# Patient Record
Sex: FEMALE | URBAN - METROPOLITAN AREA
[De-identification: names, ages, dates, MRNs, and addresses within clinical notes are randomized per-mention and may not be internally consistent; named-entity substitution may affect disease eponyms.]

---

## 2023-10-31 ENCOUNTER — HOSPITAL ENCOUNTER (INPATIENT)
Age: 23
LOS: 2 days | Discharge: HOME | End: 2023-11-02
Attending: OBSTETRICS & GYNECOLOGY | Admitting: OBSTETRICS & GYNECOLOGY

## 2023-10-31 PROBLEM — Z37.9 NORMAL LABOR: Status: ACTIVE | Noted: 2023-10-31

## 2023-10-31 LAB
ABO + RH BLD: NORMAL
AMNISURE, POC: POSITIVE
AMPHET UR QL SCN: NEGATIVE
BARBITURATES UR QL SCN: NEGATIVE
BENZODIAZ UR QL: NEGATIVE
BLOOD GROUP ANTIBODIES SERPL: NORMAL
CANNABINOIDS UR QL SCN: NEGATIVE
COCAINE UR QL SCN: NEGATIVE
ERYTHROCYTE [DISTWIDTH] IN BLOOD BY AUTOMATED COUNT: 15.9 % (ref 11.9–14.6)
HCT VFR BLD AUTO: 28.9 % (ref 35.8–46.3)
HGB BLD-MCNC: 8.9 G/DL (ref 11.7–15.4)
Lab: NORMAL
MCH RBC QN AUTO: 25 PG (ref 26.1–32.9)
MCHC RBC AUTO-ENTMCNC: 30.8 G/DL (ref 31.4–35)
MCV RBC AUTO: 81.2 FL (ref 82–102)
METHADONE UR QL: NEGATIVE
NEGATIVE QC PASS/FAIL: NORMAL
NRBC # BLD: 0 K/UL (ref 0–0.2)
OPIATES UR QL: NEGATIVE
PCP UR QL: NEGATIVE
PLATELET # BLD AUTO: 254 K/UL (ref 150–450)
PMV BLD AUTO: 9.6 FL (ref 9.4–12.3)
POSITIVE QC PASS/FAIL: NORMAL
RBC # BLD AUTO: 3.56 M/UL (ref 4.05–5.2)
RPR SER QL: NONREACTIVE
RUBV IGG SERPL IA-ACNC: 47.8 IU/ML
SPECIMEN EXP DATE BLD: NORMAL
WBC # BLD AUTO: 12 K/UL (ref 4.3–11.1)

## 2023-10-31 PROCEDURE — 6370000000 HC RX 637 (ALT 250 FOR IP): Performed by: OBSTETRICS & GYNECOLOGY

## 2023-10-31 PROCEDURE — 7100000010 HC PHASE II RECOVERY - FIRST 15 MIN

## 2023-10-31 PROCEDURE — 86780 TREPONEMA PALLIDUM: CPT

## 2023-10-31 PROCEDURE — 2580000003 HC RX 258: Performed by: OBSTETRICS & GYNECOLOGY

## 2023-10-31 PROCEDURE — 36415 COLL VENOUS BLD VENIPUNCTURE: CPT

## 2023-10-31 PROCEDURE — 4A1HXCZ MONITORING OF PRODUCTS OF CONCEPTION, CARDIAC RATE, EXTERNAL APPROACH: ICD-10-PCS | Performed by: OBSTETRICS & GYNECOLOGY

## 2023-10-31 PROCEDURE — 7220000101 HC DELIVERY VAGINAL/SINGLE

## 2023-10-31 PROCEDURE — 87150 DNA/RNA AMPLIFIED PROBE: CPT

## 2023-10-31 PROCEDURE — 1100000000 HC RM PRIVATE

## 2023-10-31 PROCEDURE — 80307 DRUG TEST PRSMV CHEM ANLYZR: CPT

## 2023-10-31 PROCEDURE — 86900 BLOOD TYPING SEROLOGIC ABO: CPT

## 2023-10-31 PROCEDURE — 86803 HEPATITIS C AB TEST: CPT

## 2023-10-31 PROCEDURE — 87340 HEPATITIS B SURFACE AG IA: CPT

## 2023-10-31 PROCEDURE — 6360000002 HC RX W HCPCS: Performed by: OBSTETRICS & GYNECOLOGY

## 2023-10-31 PROCEDURE — 86901 BLOOD TYPING SEROLOGIC RH(D): CPT

## 2023-10-31 PROCEDURE — 86762 RUBELLA ANTIBODY: CPT

## 2023-10-31 PROCEDURE — 7100000011 HC PHASE II RECOVERY - ADDTL 15 MIN

## 2023-10-31 PROCEDURE — 59025 FETAL NON-STRESS TEST: CPT

## 2023-10-31 PROCEDURE — 99285 EMERGENCY DEPT VISIT HI MDM: CPT

## 2023-10-31 PROCEDURE — 85027 COMPLETE CBC AUTOMATED: CPT

## 2023-10-31 PROCEDURE — 86850 RBC ANTIBODY SCREEN: CPT

## 2023-10-31 PROCEDURE — 87389 HIV-1 AG W/HIV-1&-2 AB AG IA: CPT

## 2023-10-31 PROCEDURE — 7210000100 HC LABOR FEE PER 1 HR

## 2023-10-31 PROCEDURE — 86592 SYPHILIS TEST NON-TREP QUAL: CPT

## 2023-10-31 PROCEDURE — 87081 CULTURE SCREEN ONLY: CPT

## 2023-10-31 RX ORDER — SODIUM CHLORIDE 0.9 % (FLUSH) 0.9 %
5-40 SYRINGE (ML) INJECTION PRN
Status: DISCONTINUED | OUTPATIENT
Start: 2023-10-31 | End: 2023-11-02 | Stop reason: HOSPADM

## 2023-10-31 RX ORDER — SODIUM CHLORIDE, SODIUM LACTATE, POTASSIUM CHLORIDE, AND CALCIUM CHLORIDE .6; .31; .03; .02 G/100ML; G/100ML; G/100ML; G/100ML
500 INJECTION, SOLUTION INTRAVENOUS PRN
Status: DISCONTINUED | OUTPATIENT
Start: 2023-10-31 | End: 2023-11-01

## 2023-10-31 RX ORDER — SODIUM CHLORIDE 0.9 % (FLUSH) 0.9 %
5-40 SYRINGE (ML) INJECTION PRN
Status: DISCONTINUED | OUTPATIENT
Start: 2023-10-31 | End: 2023-11-01

## 2023-10-31 RX ORDER — LIDOCAINE HYDROCHLORIDE 20 MG/ML
JELLY TOPICAL
Status: DISPENSED
Start: 2023-10-31 | End: 2023-11-01

## 2023-10-31 RX ORDER — ONDANSETRON 2 MG/ML
4 INJECTION INTRAMUSCULAR; INTRAVENOUS EVERY 6 HOURS PRN
Status: DISCONTINUED | OUTPATIENT
Start: 2023-10-31 | End: 2023-11-01

## 2023-10-31 RX ORDER — LIDOCAINE HYDROCHLORIDE 10 MG/ML
INJECTION, SOLUTION INFILTRATION; PERINEURAL
Status: DISPENSED
Start: 2023-10-31 | End: 2023-11-01

## 2023-10-31 RX ORDER — SODIUM CHLORIDE, SODIUM LACTATE, POTASSIUM CHLORIDE, CALCIUM CHLORIDE 600; 310; 30; 20 MG/100ML; MG/100ML; MG/100ML; MG/100ML
INJECTION, SOLUTION INTRAVENOUS CONTINUOUS
Status: DISCONTINUED | OUTPATIENT
Start: 2023-10-31 | End: 2023-11-01

## 2023-10-31 RX ORDER — DOCUSATE SODIUM 100 MG/1
100 CAPSULE, LIQUID FILLED ORAL 2 TIMES DAILY PRN
Status: DISCONTINUED | OUTPATIENT
Start: 2023-10-31 | End: 2023-11-02 | Stop reason: HOSPADM

## 2023-10-31 RX ORDER — SODIUM CHLORIDE 9 MG/ML
INJECTION, SOLUTION INTRAVENOUS PRN
Status: DISCONTINUED | OUTPATIENT
Start: 2023-10-31 | End: 2023-11-02 | Stop reason: HOSPADM

## 2023-10-31 RX ORDER — ACETAMINOPHEN 325 MG/1
650 TABLET ORAL EVERY 4 HOURS PRN
Status: DISCONTINUED | OUTPATIENT
Start: 2023-10-31 | End: 2023-10-31

## 2023-10-31 RX ORDER — IBUPROFEN 800 MG/1
800 TABLET ORAL EVERY 8 HOURS PRN
Status: DISCONTINUED | OUTPATIENT
Start: 2023-10-31 | End: 2023-11-02 | Stop reason: HOSPADM

## 2023-10-31 RX ORDER — SODIUM CHLORIDE, SODIUM LACTATE, POTASSIUM CHLORIDE, AND CALCIUM CHLORIDE .6; .31; .03; .02 G/100ML; G/100ML; G/100ML; G/100ML
1000 INJECTION, SOLUTION INTRAVENOUS PRN
Status: DISCONTINUED | OUTPATIENT
Start: 2023-10-31 | End: 2023-11-01

## 2023-10-31 RX ORDER — LANOLIN
CREAM (ML) TOPICAL PRN
Status: DISCONTINUED | OUTPATIENT
Start: 2023-10-31 | End: 2023-11-02 | Stop reason: HOSPADM

## 2023-10-31 RX ORDER — SODIUM CHLORIDE 9 MG/ML
INJECTION, SOLUTION INTRAVENOUS PRN
Status: DISCONTINUED | OUTPATIENT
Start: 2023-10-31 | End: 2023-11-01

## 2023-10-31 RX ORDER — TRANEXAMIC ACID 10 MG/ML
1000 INJECTION, SOLUTION INTRAVENOUS
Status: DISCONTINUED | OUTPATIENT
Start: 2023-10-31 | End: 2023-11-01

## 2023-10-31 RX ORDER — SODIUM CHLORIDE 0.9 % (FLUSH) 0.9 %
5-40 SYRINGE (ML) INJECTION EVERY 12 HOURS SCHEDULED
Status: DISCONTINUED | OUTPATIENT
Start: 2023-10-31 | End: 2023-11-02 | Stop reason: HOSPADM

## 2023-10-31 RX ORDER — METHYLERGONOVINE MALEATE 0.2 MG/ML
200 INJECTION INTRAVENOUS ONCE
Status: COMPLETED | OUTPATIENT
Start: 2023-10-31 | End: 2023-10-31

## 2023-10-31 RX ORDER — SODIUM CHLORIDE 0.9 % (FLUSH) 0.9 %
5-40 SYRINGE (ML) INJECTION EVERY 12 HOURS SCHEDULED
Status: DISCONTINUED | OUTPATIENT
Start: 2023-10-31 | End: 2023-11-01

## 2023-10-31 RX ORDER — ACETAMINOPHEN 500 MG
1000 TABLET ORAL EVERY 8 HOURS PRN
Status: DISCONTINUED | OUTPATIENT
Start: 2023-10-31 | End: 2023-11-02 | Stop reason: HOSPADM

## 2023-10-31 RX ADMIN — ACETAMINOPHEN 1000 MG: 500 TABLET, FILM COATED ORAL at 23:47

## 2023-10-31 RX ADMIN — IBUPROFEN 800 MG: 800 TABLET, FILM COATED ORAL at 13:16

## 2023-10-31 RX ADMIN — IBUPROFEN 800 MG: 800 TABLET, FILM COATED ORAL at 21:38

## 2023-10-31 RX ADMIN — OXYTOCIN 87.3 MILLI-UNITS/MIN: 10 INJECTION, SOLUTION INTRAMUSCULAR; INTRAVENOUS at 12:43

## 2023-10-31 RX ADMIN — ACETAMINOPHEN 1000 MG: 500 TABLET, FILM COATED ORAL at 14:55

## 2023-10-31 RX ADMIN — DOCUSATE SODIUM 100 MG: 100 CAPSULE, LIQUID FILLED ORAL at 21:39

## 2023-10-31 RX ADMIN — METHYLERGONOVINE MALEATE 200 MCG: 0.2 INJECTION, SOLUTION INTRAMUSCULAR; INTRAVENOUS at 13:28

## 2023-10-31 RX ADMIN — SODIUM CHLORIDE 5 MILLION UNITS: 900 INJECTION INTRAVENOUS at 11:32

## 2023-10-31 RX ADMIN — SODIUM CHLORIDE, POTASSIUM CHLORIDE, SODIUM LACTATE AND CALCIUM CHLORIDE: 600; 310; 30; 20 INJECTION, SOLUTION INTRAVENOUS at 10:43

## 2023-10-31 ASSESSMENT — PAIN DESCRIPTION - ORIENTATION
ORIENTATION: LOWER
ORIENTATION: LOWER

## 2023-10-31 ASSESSMENT — PAIN SCALES - GENERAL
PAINLEVEL_OUTOF10: 3
PAINLEVEL_OUTOF10: 3
PAINLEVEL_OUTOF10: 5

## 2023-10-31 ASSESSMENT — PAIN DESCRIPTION - DESCRIPTORS
DESCRIPTORS: CRAMPING
DESCRIPTORS: CRAMPING

## 2023-10-31 ASSESSMENT — PAIN DESCRIPTION - LOCATION
LOCATION: ABDOMEN
LOCATION: ABDOMEN

## 2023-10-31 NOTE — L&D DELIVERY NOTE
Patient name: Michelle Murray  Date of service: 10/31/23  MRN: [id number]  CSN: [id number]  : 2000  Delivering doctor: Juan Brennan MD  Anesthesia type: None  Procedure: Spontaneous vaginal delivery  Estimated blood loss: less than 500 mL  Operative Findings: female infant, Apgars were 7 and 9 at one and five minutes respectively. Weight was 8 lbs 14 ozs  Specimens sent to pathology/lab: cord blood and cord segment  Complications: Shoulder dystocia lasting less than 30 seconds. Relieved by Chelsey maneuver, suprapubic pressure, and delivery of posterior arm  Postprocedure patient condition: Good    She was noted to be complete and began to push. She went on to deliver a live female infant over an intact (no episiotomy) perineum. Shoulder dystocia was encountered as detailed above. No nuchal cord was noted. Pitocin was bolused in the IV. The cord was doubly clamped and cut. The infant was attended to by nursery staff. Cord blood specimens were obtained. The placenta spontaneously delivered and was found to be intact with a three-vessel cord. Massage of the abdomen revealed the fundus to be firm. Inspection of the perineum revealed no lacerations There was excellent hemostasis and good uterine tone. Mother and infant remained in the room postpartum in stable condition. Sharps were disposed of appropriately.

## 2023-10-31 NOTE — PROGRESS NOTES
Patient up to bathroom with assistance. Ewa-care taught and completed. Questions encouraged and answered. Patient ambulating without difficulty, encouraged to call for needs or concerns. Verbalizes understanding.

## 2023-10-31 NOTE — CARE COORDINATION
SW consult received -  will meet with patient after delivery.     JOHN Rivas-DOUG, [facility]   [phone]

## 2023-10-31 NOTE — PROGRESS NOTES
Pt set up for delivery; Dr. Que Lindsay, med student, myself, scrub tech, and charge RN at bedside; pt pushing.

## 2023-10-31 NOTE — PROGRESS NOTES
SBAR report received from Adam Barrios RN; care assumed. Pt admitted to room 433 for labor; IV started; admission completed with help from .

## 2023-10-31 NOTE — PROGRESS NOTES
10/31/23 1011   Cervical Exam   Dilation (cm) 8  (7-8)   Effacement 90   Station -2   Station (Labor Curve Graph) 7   OB Examiner MD NICK   Membrane/Amniotic Fluid   Membrane Status Spontaneous   Rupture Date 10/31/23   Rupture Time 0730   Amniotic Fluid Color Clear   Amniotic Fluid Odor None   Amniotic Fluid Amount Moderate

## 2023-11-01 LAB
GP B STREP DNA SPEC QL NAA+PROBE: NOT DETECTED
HBV SURFACE AG SER QL: NONREACTIVE
HCV AB SER QL: NONREACTIVE
HGB BLD-MCNC: 7.5 G/DL (ref 11.7–15.4)
HIV 1+2 AB+HIV1 P24 AG SERPL QL IA: NONREACTIVE
HIV 1/2 RESULT COMMENT: NORMAL
T PALLIDUM AB SER QL IA: NON REACTIVE

## 2023-11-01 PROCEDURE — 36415 COLL VENOUS BLD VENIPUNCTURE: CPT

## 2023-11-01 PROCEDURE — 1100000000 HC RM PRIVATE

## 2023-11-01 PROCEDURE — 87591 N.GONORRHOEAE DNA AMP PROB: CPT

## 2023-11-01 PROCEDURE — 85018 HEMOGLOBIN: CPT

## 2023-11-01 PROCEDURE — 87491 CHLMYD TRACH DNA AMP PROBE: CPT

## 2023-11-01 PROCEDURE — 6370000000 HC RX 637 (ALT 250 FOR IP): Performed by: OBSTETRICS & GYNECOLOGY

## 2023-11-01 RX ORDER — OXYCODONE HYDROCHLORIDE 5 MG/1
5 TABLET ORAL EVERY 6 HOURS PRN
Status: DISCONTINUED | OUTPATIENT
Start: 2023-11-01 | End: 2023-11-02 | Stop reason: HOSPADM

## 2023-11-01 RX ADMIN — IBUPROFEN 800 MG: 800 TABLET, FILM COATED ORAL at 18:46

## 2023-11-01 RX ADMIN — ACETAMINOPHEN 1000 MG: 500 TABLET, FILM COATED ORAL at 21:45

## 2023-11-01 RX ADMIN — IBUPROFEN 800 MG: 800 TABLET, FILM COATED ORAL at 04:23

## 2023-11-01 RX ADMIN — OXYCODONE HYDROCHLORIDE 5 MG: 5 TABLET ORAL at 08:36

## 2023-11-01 RX ADMIN — ACETAMINOPHEN 1000 MG: 500 TABLET, FILM COATED ORAL at 12:18

## 2023-11-01 ASSESSMENT — PAIN DESCRIPTION - DESCRIPTORS
DESCRIPTORS: CRAMPING
DESCRIPTORS: CRAMPING
DESCRIPTORS: ACHING;CRAMPING
DESCRIPTORS: ACHING;CRAMPING

## 2023-11-01 ASSESSMENT — PAIN DESCRIPTION - ORIENTATION
ORIENTATION: ANTERIOR;LOWER
ORIENTATION: LOWER

## 2023-11-01 ASSESSMENT — PAIN SCALES - GENERAL
PAINLEVEL_OUTOF10: 4
PAINLEVEL_OUTOF10: 8
PAINLEVEL_OUTOF10: 8
PAINLEVEL_OUTOF10: 3

## 2023-11-01 ASSESSMENT — PAIN DESCRIPTION - LOCATION
LOCATION: ABDOMEN

## 2023-11-01 NOTE — PLAN OF CARE
Problem: Pain  Goal: Verbalizes/displays adequate comfort level or baseline comfort level  10/31/2023 2205 by Terrence Menchaca RN  Outcome: Progressing  10/31/2023 1546 by Partha Guerrero RN  Outcome: Progressing     Problem: Postpartum  Goal: Experiences normal postpartum course  Description:  Postpartum OB-Pregnancy care plan goal which identifies if the mother is experiencing a normal postpartum course  10/31/2023 2205 by Terrence Menchaca RN  Outcome: Progressing  10/31/2023 1546 by Partha Guerrero RN  Outcome: Progressing  Goal: Appropriate maternal -  bonding  Description:  Postpartum OB-Pregnancy care plan goal which identifies if the mother and  are bonding appropriately  10/31/2023 2205 by Terrence Menchaca RN  Outcome: Progressing  10/31/2023 154 by Partha Guerrero RN  Outcome: Progressing  Goal: Establishment of infant feeding pattern  Description:  Postpartum OB-Pregnancy care plan goal which identifies if the mother is establishing a feeding pattern with their   10/31/2023 2205 by Terrence Menchaca RN  Outcome: Progressing  10/31/2023 1546 by Partha Guerrero RN  Outcome: Progressing  Goal: Incisions, wounds, or drain sites healing without S/S of infection  10/31/2023 1546 by Partha Guerrero RN  Outcome: Progressing     Problem: Infection - Adult  Goal: Absence of infection at discharge  10/31/2023 154 by Partha Guerrero RN  Outcome: Progressing  Goal: Absence of infection during hospitalization  10/31/2023 2205 by Terrence Menchaca RN  Outcome: Progressing  10/31/2023 1546 by Partha Guerrero RN  Outcome: Progressing  Goal: Absence of fever/infection during anticipated neutropenic period  10/31/2023 1546 by Partha Guerrero RN  Outcome: Progressing     Problem: Safety - Adult  Goal: Free from fall injury  10/31/2023 2205 by Terrence Menchaca RN  Outcome: Progressing  10/31/2023 154 by Partha Guerrero RN  Outcome: Progressing

## 2023-11-01 NOTE — PROGRESS NOTES
stick used at bedside.  # [id number] 1954 KOBY Bubba Carusokoby connected. Discussed with patient discharge day of tomorrow as instructed by Blaine Ann at [address] [facility], patient agreed to discharge tomorrow. Discussed with patient pain level, states 8-10. Patient pain meds are Tylenol and Motrin, will call [facility] regarding pain. Discussed with patient bleeding precautions and to call with any clots larger than a golf ball, patient verbalized understanding. Discussed with patient need for using  stick for future communication, patient declined. Patient states she can understand Nurse instructions.

## 2023-11-01 NOTE — CARE COORDINATION
Chart reviewed - No prenatal care. UDS negative. Umbilical drug screen pending. SW met with patient to complete initial assessment. Patient's primary language is [place]. Initially, we communicated thru a [place]  (Edith De La Cruz); however, patient then denied the need for this service. Per patient, she, her  Adin Matthews) and their 3 other children drove their car to [place] on 10/30/23 as Maddy Friesland was \"looking for work. \"  [place] came [place] 2-3 months ago from [place]. Prior to traveling to [place], they were staying with Scott's brother in [place] ([address] [place] [address],[address], [place] [address]). Patient/Scott cell: [phone]. Patient states that they plan to return to [place] as soon as they are able to pick-up baby Michell's birth certificate. Verbal and written education provided on how to  the birth certificate. Family was provided with 2 bags from [employer]. \"  Patient plans to formula feed, and she denied any difficulty with purchasing formula. Additionally, patient also has a car seat for [place]. Patient requested assistance with obtaining a bed for  as well as clothes for all 4 of her children. List of community resources/pregnancy centers provided that may be able to provide assistance.       Appointment scheduled for haider Noonan to be seen at [facility] for Pediatric Medicine:   at 10:30AM  [address], [address], [address] [place] [address]   [phone]

## 2023-11-01 NOTE — PROGRESS NOTES
Spoke to Dr. Dionna Bynum regarding patient's stated pain of 8-10 and request for something stronger than Tylenol, orders received, see MAR.

## 2023-11-02 VITALS
OXYGEN SATURATION: 98 % | RESPIRATION RATE: 16 BRPM | HEART RATE: 76 BPM | SYSTOLIC BLOOD PRESSURE: 104 MMHG | TEMPERATURE: 98.2 F | HEIGHT: 63 IN | DIASTOLIC BLOOD PRESSURE: 60 MMHG

## 2023-11-02 PROCEDURE — 6370000000 HC RX 637 (ALT 250 FOR IP): Performed by: OBSTETRICS & GYNECOLOGY

## 2023-11-02 RX ORDER — IBUPROFEN 800 MG/1
800 TABLET ORAL EVERY 8 HOURS PRN
Qty: 60 TABLET | Refills: 0 | Status: SHIPPED | OUTPATIENT
Start: 2023-11-02

## 2023-11-02 RX ORDER — FERROUS SULFATE 325(65) MG
325 TABLET ORAL 2 TIMES DAILY
Qty: 60 TABLET | Refills: 2 | Status: SHIPPED | OUTPATIENT
Start: 2023-11-02

## 2023-11-02 RX ADMIN — IBUPROFEN 800 MG: 800 TABLET, FILM COATED ORAL at 02:53

## 2023-11-02 RX ADMIN — ACETAMINOPHEN 1000 MG: 500 TABLET, FILM COATED ORAL at 05:48

## 2023-11-02 ASSESSMENT — PAIN DESCRIPTION - ORIENTATION: ORIENTATION: ANTERIOR;LOWER

## 2023-11-02 ASSESSMENT — PAIN DESCRIPTION - LOCATION: LOCATION: ABDOMEN

## 2023-11-02 ASSESSMENT — PAIN SCALES - GENERAL: PAINLEVEL_OUTOF10: 3

## 2023-11-02 ASSESSMENT — PAIN DESCRIPTION - DESCRIPTORS: DESCRIPTORS: CRAMPING
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
## 2023-11-02 NOTE — PROGRESS NOTES
Pt discharged to home after ID bands verified and newborns code alert removed. Discharge teaching complete, pt verbalizes understanding; through interpretor services, questions encouraged. Mom walked to vehicle, while dad carried baby to car and placed in rear facing car seat. Stable at discharge.

## 2023-11-02 NOTE — PROGRESS NOTES
Pt aware to make appointment with [facility] GYN office for a 4-6 week check up. Pt verbalizes understanding through interpretor services. Encouraged patient if unable to attend appointment to find a OB in [place].

## 2023-11-02 NOTE — PROGRESS NOTES
Shift assessment complete as noted. Pt declines the need for  at this time. Patient denies needs. Questions encouraged and answered. Encouraged to call for needs or concerns. Verbalizes understanding.

## 2023-11-02 NOTE — PROGRESS NOTES
RN gave pt [place]  Depression Scale to complete. Pt returned to RN with a score of 17. Pt answered the last question \"sometimes\" to the thought of harming herself has occurred to her. RN returned to room to clarify that pt understood the question. Pt stated that she understood and she often felt very sad and felt this way. RN asked if pt had an active plan to harm herself. Pt stated that she did not have any plans to harm herself. RN asked if she felt like that now and the pt states that she does not and has not felt like harming herself in the past seven days. She said it had been longer than that but she was sad sometimes. Pt did not clarify how long when RN asked. RN put in a SW consult.

## 2023-11-02 NOTE — PROGRESS NOTES
Patient still resting with eyes closed. Nurse assessed infant and will return to assess patient when she wakes up.

## 2023-11-02 NOTE — PROGRESS NOTES
Pt resting without complaints. Patient requests nurse to come back to do assessment when family wakes up.

## 2023-11-02 NOTE — DISCHARGE SUMMARY
Obstetrical Discharge Summary     Name: Horacio Chacko MRN: [id number]  SSN: [id number]    YOB: 2000  Age: 26 y.o. Sex: female      Allergies: Patient has no known allergies. Admit Date: 10/31/2023    Discharge Date: 2023     Admitting Physician: Fady Tenorio MD     Attending Physician:  Fady Tenorio MD     * Admission Diagnoses: Normal labor [O80, Z37.9]  No prenatal care  Previous c/s    * Discharge Diagnoses:   No prenatal care  Postpartum s/p   Anemia  Information for the patient's :  Krik Jiménez [[id number]]   @[id number]@      Additional Diagnoses:    Lab Results   Component Value Date/Time    ABORH AB POSITIVE 10/31/2023 10:37 AM    There is no immunization history for the selected administration types on file for this patient. * Procedures:              * Discharge Condition: Stable    * Hospital Course: Normal hospital course following the delivery. Social work was consulted and saw patient for resources (no prenatal care, has been in [facility] for 2-3 months from [place])    * Disposition: Home    Discharge Medications:      Medication List        START taking these medications      ferrous sulfate 325 (65 Fe) MG tablet  Commonly known as: IRON 325  Take 1 tablet by mouth 2 times daily     ibuprofen 800 MG tablet  Commonly known as: ADVIL;MOTRIN  Take 1 tablet by mouth every 8 hours as needed for Pain               Where to Get Your Medications        Information about where to get these medications is not yet available    Ask your nurse or doctor about these medications  ferrous sulfate 325 (65 Fe) MG tablet  ibuprofen 800 MG tablet         * Follow-up Care/Patient Instructions:   Activity: no sex for 6 weeks and no heavy lifting for 6 weeks  Diet: regular diet  Wound Care: pericare, otherwise none needed    Follow up:     MD Zayra Galo MD  Obstetrics & Gynecology Gynecology Obstetrics [phone]

## 2023-11-02 NOTE — DISCHARGE INSTRUCTIONS
says it is okay. Wait until you are healed (about 4 to 6 weeks) before you have sexual intercourse. Your doctor will tell you when it is okay to have sex. If you don't want to get pregnant, talk to your doctor about birth control. You can get pregnant even before your period returns. Also, you can get pregnant while you are breastfeeding. Mental health  It's normal to have some sadness, anxiety, sleeplessness, and mood swings after you go home. If you feel upset or hopeless for more than a few days or are having trouble doing the things you need to do, talk to your doctor. Constipation and hemorrhoids  Drink plenty of fluids. If you have kidney, heart, or liver disease and have to limit fluids, talk with your doctor before you increase the amount of fluids you drink. Eat plenty of fiber each day. Have a bran muffin or bran cereal for breakfast. Try eating a piece of fruit for a mid-afternoon snack. For painful, itchy hemorrhoids, put ice or a cold pack on the area several times a day for 10 minutes at a time. Follow this by putting a warm compress on the area for another 10 to 20 minutes or by sitting in a shallow, warm bath. When should you call for help? Share this information with your partner, family, or a friend. They can help you watch for warning signs. Call 911  anytime you think you may need emergency care. For example, call if:    You have thoughts of harming yourself, your baby, or another person. You passed out (lost consciousness). You have chest pain, are short of breath, or cough up blood. You have a seizure. Call your doctor now or seek immediate medical care if:    You have signs of hemorrhage (too much bleeding), such as:  Heavy vaginal bleeding. This means that you are soaking through one or more pads in an hour. Or you pass blood clots bigger than an egg. Feeling dizzy or lightheaded, or you feel like you may faint.   Feeling so tired or weak that you cannot do your usual

## 2023-11-03 LAB
C TRACH RRNA SPEC QL NAA+PROBE: NEGATIVE
N GONORRHOEA RRNA SPEC QL NAA+PROBE: NEGATIVE
SPECIMEN SOURCE: NORMAL

## 2023-11-03 RX ORDER — FERROUS SULFATE 325(65) MG
325 TABLET ORAL 2 TIMES DAILY
Qty: 60 TABLET | Refills: 5 | Status: SHIPPED | OUTPATIENT
Start: 2023-11-03

## 2023-11-03 RX ORDER — IBUPROFEN 800 MG/1
800 TABLET ORAL 3 TIMES DAILY PRN
Qty: 90 TABLET | Refills: 5 | Status: SHIPPED | OUTPATIENT
Start: 2023-11-03

## 2023-11-04 LAB
BACTERIA SPEC CULT: NORMAL
SERVICE CMNT-IMP: NORMAL